# Patient Record
Sex: FEMALE | Race: ASIAN | NOT HISPANIC OR LATINO | Employment: STUDENT | URBAN - METROPOLITAN AREA
[De-identification: names, ages, dates, MRNs, and addresses within clinical notes are randomized per-mention and may not be internally consistent; named-entity substitution may affect disease eponyms.]

---

## 2022-05-17 ENCOUNTER — OFFICE VISIT (OUTPATIENT)
Dept: FAMILY MEDICINE CLINIC | Facility: CLINIC | Age: 32
End: 2022-05-17
Payer: COMMERCIAL

## 2022-05-17 VITALS
WEIGHT: 151.6 LBS | TEMPERATURE: 97.5 F | OXYGEN SATURATION: 99 % | SYSTOLIC BLOOD PRESSURE: 100 MMHG | DIASTOLIC BLOOD PRESSURE: 62 MMHG | BODY MASS INDEX: 25.26 KG/M2 | RESPIRATION RATE: 16 BRPM | HEART RATE: 90 BPM | HEIGHT: 65 IN

## 2022-05-17 DIAGNOSIS — E28.2 PCOD (POLYCYSTIC OVARIAN DISEASE): ICD-10-CM

## 2022-05-17 DIAGNOSIS — E03.9 ACQUIRED HYPOTHYROIDISM: ICD-10-CM

## 2022-05-17 DIAGNOSIS — Z00.00 PHYSICAL EXAM, ANNUAL: Primary | ICD-10-CM

## 2022-05-17 PROCEDURE — 99385 PREV VISIT NEW AGE 18-39: CPT | Performed by: FAMILY MEDICINE

## 2022-05-17 PROCEDURE — 1036F TOBACCO NON-USER: CPT | Performed by: FAMILY MEDICINE

## 2022-05-17 PROCEDURE — 3008F BODY MASS INDEX DOCD: CPT | Performed by: FAMILY MEDICINE

## 2022-05-17 PROCEDURE — 3725F SCREEN DEPRESSION PERFORMED: CPT | Performed by: FAMILY MEDICINE

## 2022-05-17 RX ORDER — DIPHENOXYLATE HYDROCHLORIDE AND ATROPINE SULFATE 2.5; .025 MG/1; MG/1
1 TABLET ORAL DAILY
COMMUNITY

## 2022-05-17 RX ORDER — PROGESTERONE 200 MG/1
CAPSULE ORAL
COMMUNITY
Start: 2022-05-10 | End: 2022-06-10

## 2022-05-17 RX ORDER — LEVOTHYROXINE SODIUM 0.07 MG/1
TABLET ORAL
COMMUNITY
Start: 2022-05-10 | End: 2022-05-24 | Stop reason: SDUPTHER

## 2022-05-17 NOTE — PROGRESS NOTES
Assessment/Plan:    No problem-specific Assessment & Plan notes found for this encounter  Diagnoses and all orders for this visit:    Physical exam, annual  Comments:  tdap through OB  continue f/u with OB    PCOD (polycystic ovarian disease)  Comments:  fasting insulin nml    Acquired hypothyroidism  Comments:  tsh at goal currently for 1st trimester  may follow with either me or OB    Other orders  -     levothyroxine 75 mcg tablet  -     Progesterone 200 MG CAPS; INSERT 1 CAPSULE(S) BY VAGINAL ROUTE TWICE DAILY WHEN DIRECTED  -     multivitamin (THERAGRAN) TABS; Take 1 tablet by mouth in the morning  Subjective:      Patient ID: Aimee Bolden is a 28 y o  female  HPI   Pt is 10 weeks pregnant and presents for establishment  She is doing well in general   Has hx of wheat and lactose intolerance, so she tries to limit that  Taking PNV  Had been seeing Dr Brit Viveros due to anovulation related to pcos  Is to establish with obgyn this week  Was started on synthroid by reproductive endo and last tsh 0 31 which is within range for 1st trimester  In general she feels well  Due for dental exam   Due for tdap during pregnancy, but she is otherwise up to date preventively  Moved from [place] 8 months ago  Pt has hx of reflux which has been somewhat worse over the last few days  Hasn't been daily  Cholesterol pre-conception showed trigs 180  Rest of panel was okay  Pt is walking for exercise currently  The following portions of the patient's history were reviewed and updated as appropriate: allergies, current medications, past family history, past medical history, past social history, past surgical history and problem list     Review of Systems   Constitutional: Negative for chills, fatigue, fever and unexpected weight change  HENT: Negative for congestion, ear pain, hearing loss, postnasal drip, rhinorrhea, sinus pressure, sinus pain, sore throat, trouble swallowing and voice change  Eyes: Negative for pain, redness and visual disturbance  Respiratory: Negative for cough and shortness of breath  Cardiovascular: Negative for chest pain, palpitations and leg swelling  Gastrointestinal: Negative for abdominal pain, constipation, diarrhea and nausea  Endocrine: Negative for cold intolerance, heat intolerance, polydipsia and polyuria  Genitourinary: Negative for dysuria, frequency and urgency  Musculoskeletal: Negative for arthralgias, joint swelling and myalgias  Skin: Negative for rash  No suspicious lesions   Neurological: Negative for weakness, numbness and headaches  Hematological: Negative for adenopathy  Objective:      /62   Pulse 90   Temp 97 5 °F (36 4 °C)   Resp 16   Ht 5' 4 5" (1 638 m)   Wt 68 8 kg (151 lb 9 6 oz)   LMP 03/03/2022 (Approximate)   SpO2 99%   BMI 25 62 kg/m²          Physical Exam  Constitutional:       General: She is not in acute distress  Appearance: She is well-developed  HENT:      Head: Normocephalic and atraumatic  Right Ear: Tympanic membrane, ear canal and external ear normal       Left Ear: Tympanic membrane, ear canal and external ear normal       Nose: Nose normal       Mouth/Throat:      Pharynx: No oropharyngeal exudate  Eyes:      Conjunctiva/sclera: Conjunctivae normal       Pupils: Pupils are equal, round, and reactive to light  Neck:      Thyroid: No thyromegaly  Vascular: No carotid bruit or JVD  Cardiovascular:      Rate and Rhythm: Regular rhythm  Heart sounds: S1 normal and S2 normal  No murmur heard  No friction rub  No gallop  No S3 or S4 sounds  Pulmonary:      Effort: Pulmonary effort is normal       Breath sounds: Normal breath sounds  No wheezing, rhonchi or rales  Abdominal:      General: Bowel sounds are normal  There is no distension  Palpations: Abdomen is soft  Tenderness: There is no abdominal tenderness     Lymphadenopathy:      Cervical: No cervical adenopathy  Neurological:      Mental Status: She is alert and oriented to person, place, and time  Cranial Nerves: No cranial nerve deficit  Sensory: No sensory deficit  Deep Tendon Reflexes: Reflexes are normal and symmetric  Depression Screening and Follow-up Plan: Patient was screened for depression during today's encounter  They screened negative with a PHQ-2 score of 0

## 2022-05-24 ENCOUNTER — APPOINTMENT (OUTPATIENT)
Dept: LAB | Facility: AMBULARY SURGERY CENTER | Age: 32
End: 2022-05-24
Attending: OBSTETRICS & GYNECOLOGY
Payer: COMMERCIAL

## 2022-05-24 ENCOUNTER — INITIAL PRENATAL (OUTPATIENT)
Dept: OBGYN CLINIC | Facility: CLINIC | Age: 32
End: 2022-05-24

## 2022-05-24 ENCOUNTER — TELEPHONE (OUTPATIENT)
Dept: OBGYN CLINIC | Facility: CLINIC | Age: 32
End: 2022-05-24

## 2022-05-24 VITALS — WEIGHT: 152.8 LBS | BODY MASS INDEX: 25.82 KG/M2

## 2022-05-24 DIAGNOSIS — E03.9 ACQUIRED HYPOTHYROIDISM: ICD-10-CM

## 2022-05-24 DIAGNOSIS — Z36.9 FIRST TRIMESTER SCREENING: ICD-10-CM

## 2022-05-24 DIAGNOSIS — Z34.01 ENCOUNTER FOR SUPERVISION OF NORMAL FIRST PREGNANCY IN FIRST TRIMESTER: ICD-10-CM

## 2022-05-24 DIAGNOSIS — Z34.01 ENCOUNTER FOR SUPERVISION OF NORMAL FIRST PREGNANCY IN FIRST TRIMESTER: Primary | ICD-10-CM

## 2022-05-24 LAB
ABO GROUP BLD: NORMAL
BASOPHILS # BLD AUTO: 0.03 THOUSANDS/ΜL (ref 0–0.1)
BASOPHILS NFR BLD AUTO: 0 % (ref 0–1)
BLD GP AB SCN SERPL QL: NEGATIVE
EOSINOPHIL # BLD AUTO: 0.1 THOUSAND/ΜL (ref 0–0.61)
EOSINOPHIL NFR BLD AUTO: 1 % (ref 0–6)
ERYTHROCYTE [DISTWIDTH] IN BLOOD BY AUTOMATED COUNT: 13.5 % (ref 11.6–15.1)
GLUCOSE 1H P 50 G GLC PO SERPL-MCNC: 129 MG/DL (ref 40–134)
HCT VFR BLD AUTO: 37 % (ref 34.8–46.1)
HGB BLD-MCNC: 12.1 G/DL (ref 11.5–15.4)
IMM GRANULOCYTES # BLD AUTO: 0.04 THOUSAND/UL (ref 0–0.2)
IMM GRANULOCYTES NFR BLD AUTO: 1 % (ref 0–2)
LYMPHOCYTES # BLD AUTO: 1.6 THOUSANDS/ΜL (ref 0.6–4.47)
LYMPHOCYTES NFR BLD AUTO: 20 % (ref 14–44)
MCH RBC QN AUTO: 29.9 PG (ref 26.8–34.3)
MCHC RBC AUTO-ENTMCNC: 32.7 G/DL (ref 31.4–37.4)
MCV RBC AUTO: 91 FL (ref 82–98)
MONOCYTES # BLD AUTO: 0.44 THOUSAND/ΜL (ref 0.17–1.22)
MONOCYTES NFR BLD AUTO: 6 % (ref 4–12)
NEUTROPHILS # BLD AUTO: 5.67 THOUSANDS/ΜL (ref 1.85–7.62)
NEUTS SEG NFR BLD AUTO: 72 % (ref 43–75)
NRBC BLD AUTO-RTO: 0 /100 WBCS
PLATELET # BLD AUTO: 179 THOUSANDS/UL (ref 149–390)
PMV BLD AUTO: 12.9 FL (ref 8.9–12.7)
RBC # BLD AUTO: 4.05 MILLION/UL (ref 3.81–5.12)
RH BLD: POSITIVE
RUBV IGG SERPL IA-ACNC: 11.5 IU/ML
SPECIMEN EXPIRATION DATE: NORMAL
WBC # BLD AUTO: 7.88 THOUSAND/UL (ref 4.31–10.16)

## 2022-05-24 PROCEDURE — 87086 URINE CULTURE/COLONY COUNT: CPT

## 2022-05-24 PROCEDURE — NOBC: Performed by: OBSTETRICS & GYNECOLOGY

## 2022-05-24 PROCEDURE — 82950 GLUCOSE TEST: CPT

## 2022-05-24 PROCEDURE — 86803 HEPATITIS C AB TEST: CPT

## 2022-05-24 PROCEDURE — 86787 VARICELLA-ZOSTER ANTIBODY: CPT

## 2022-05-24 PROCEDURE — 36415 COLL VENOUS BLD VENIPUNCTURE: CPT

## 2022-05-24 PROCEDURE — 80081 OBSTETRIC PANEL INC HIV TSTG: CPT

## 2022-05-24 RX ORDER — LEVOTHYROXINE SODIUM 0.07 MG/1
75 TABLET ORAL
Qty: 30 TABLET | Refills: 0 | Status: CANCELLED | OUTPATIENT
Start: 2022-05-24

## 2022-05-24 NOTE — PROGRESS NOTES
OB INTAKE INTERVIEW  Pt presents for OB intake  Had TSH done 5/15 0 31 per pt  Done at [facility] with timed intercourse after induced ovulation  Was a pt  Of [facility]  Plan:  - Prenatal labs ordered  - Referral given for MFM at confirmation scan  - Reviewed Genetic testing options   CF: discussed with pt  States she will discuss with    SMA: discussed with pt  States she will discuss with   - Patient to call for concerns  - RTO 3-4 weeks for OB F/U visit and PAP/Cultures    ~Last pap: -Summer in [facility]       OB History        1    Para        Term                AB        Living           SAB        IAB        Ectopic        Multiple        Live Births                     Hx of  delivery prior to 36 weeks 6 days:      Last Menstrual Period:                           Ultrasound date: 02, 7W 6D  Estimated Date of Delivery:  22     Current Issues:  Constipation : yes--getting better--discussed increased fiber, hydration, activity, stool softeners                Headaches :   no            Cramping:      occ           Spotting :        no             Interview education  · [facility] Pregnancy Essentials reviewed and discussed   · Baby and [address] Handout  · [facility] MFM Handouts  · Discussed genetic testing  · Prenatal lab work: Scripts printed and given to pt         Prior Pregnancy Delivery Complications              History of  delivery or PPROM: NO  History of Shoulder Dystocia: NO              History of vacuum or forceps delivery: NO              History of 3rd/4th degree laceration: NO              History of  section: NO     Diabetes              Pregestational DM: NO              hx of GDM: NO              BMI >35: NO              first degree relative with type 2 diabetes: NO              hx of PCOS: YES              current metformin use: NO              prior hx of LGA/macrosomia: NO               Hypertension              Hx of chronic HTN: NO              hx of gestational HTN: NO              hx of preeclampsia, eclampsia, or HELLP syndrome: NO              Age 28 or older: NO              Multifetal gestation:NO  Type 1 or Type 2 DM: NO  Renal Disease: NO  Autoimmune disease (systemic lupus erythematosus, antiphospholipid antibody syndrome): NO  Nulliparity:  YES  Obesity (BMI over 30): NO  More than 10 year pregnancy interval: NO  Previous IUGR, low birthweight or small for gestational age: NO     Immunizations:                influenza vaccine: yes              Covid Vaccination: yes, booster   Discussed Tdap vaccine administration at 27-28 weeks                   Immunization History   Administered Date(s) Administered    COVID-19 Pfizer vac (Pravin-sucrose, gray cap) 12 yr+ IM 01/12/2022            Dental visit with last 6 months: No  PHQ-2/9 score: 0  MyChart activated (not 1518 years of age)?: yes     The patient was oriented to our practice and all questions were answered

## 2022-05-25 ENCOUNTER — PATIENT MESSAGE (OUTPATIENT)
Dept: FAMILY MEDICINE CLINIC | Facility: CLINIC | Age: 32
End: 2022-05-25

## 2022-05-25 LAB
BACTERIA UR CULT: NORMAL
HBV SURFACE AG SER QL: NORMAL
HCV AB SER QL: NORMAL
HIV 1+2 AB+HIV1 P24 AG SERPL QL IA: NORMAL
RPR SER QL: NORMAL
VZV IGG SER IA-ACNC: NORMAL

## 2022-06-09 PROBLEM — O99.280 HYPOTHYROIDISM AFFECTING PREGNANCY: Status: ACTIVE | Noted: 2022-05-17

## 2022-06-09 PROBLEM — Z3A.13 13 WEEKS GESTATION OF PREGNANCY: Status: ACTIVE | Noted: 2022-06-09

## 2022-06-10 ENCOUNTER — ROUTINE PRENATAL (OUTPATIENT)
Dept: PERINATAL CARE | Facility: OTHER | Age: 32
End: 2022-06-10
Payer: COMMERCIAL

## 2022-06-10 VITALS
WEIGHT: 153 LBS | BODY MASS INDEX: 25.49 KG/M2 | HEART RATE: 89 BPM | HEIGHT: 65 IN | SYSTOLIC BLOOD PRESSURE: 105 MMHG | DIASTOLIC BLOOD PRESSURE: 64 MMHG

## 2022-06-10 DIAGNOSIS — Z36.82 ENCOUNTER FOR (NT) NUCHAL TRANSLUCENCY SCAN: ICD-10-CM

## 2022-06-10 DIAGNOSIS — Z34.01 ENCOUNTER FOR SUPERVISION OF NORMAL FIRST PREGNANCY IN FIRST TRIMESTER: ICD-10-CM

## 2022-06-10 DIAGNOSIS — Z36.9 FIRST TRIMESTER SCREENING: ICD-10-CM

## 2022-06-10 DIAGNOSIS — Z3A.13 13 WEEKS GESTATION OF PREGNANCY: Primary | ICD-10-CM

## 2022-06-10 PROBLEM — Z78.9 VEGETARIAN DIET: Status: ACTIVE | Noted: 2022-06-10

## 2022-06-10 PROBLEM — E03.8 SUBCLINICAL HYPOTHYROIDISM: Status: ACTIVE | Noted: 2022-05-17

## 2022-06-10 PROCEDURE — 76813 OB US NUCHAL MEAS 1 GEST: CPT | Performed by: OBSTETRICS & GYNECOLOGY

## 2022-06-10 PROCEDURE — 99242 OFF/OP CONSLTJ NEW/EST SF 20: CPT | Performed by: OBSTETRICS & GYNECOLOGY

## 2022-06-10 RX ORDER — ASPIRIN 81 MG/1
162 TABLET, CHEWABLE ORAL DAILY
COMMUNITY

## 2022-06-10 NOTE — PROGRESS NOTES
[address]: Ms Etelvina Hatch was seen today at 13w0d for nuchal translucency ultrasound  See ultrasound report under "OB Procedures" tab  My recommendations are as follows:  1  We reviewed the availability of genetic screening, as well as diagnostic testing, which are available to all pregnant women  We reviewed limitations, risks, and benefits of screening and testing  She does not wish to pursue aneuploidy screening or diagnostic testing at this time  MSAFP screening should be ordered through your office at 15-20 weeks gestation, and completed prior to fetal anatomic survey  A detailed anatomic survey as well as transvaginal cervical length screening are recommended between 18-22 weeks gestation  2  We reviewed her history of subclinical hypothyroidism which is characterized by elevated TSH, but normal Free T4 (FT4)  Her Thyroid Peroxidase (TPO) antibody status is unknown  She is currently taking 75mcg of levothyroxine, and reports her last TSH was 0 3 in early pregnancy  We reviewed that whether to treat with levothyroxine in pregnancy is controversial   Although subclinical hypothyroidism is associated with pregnancy risks including miscarriage and  birth, it is not clear that treatment with levothyroxine improves pregnancy outcomes  Importantly, levothyroxine treatment is not associated with adverse pregnancy risks  The decision to treat subclinical hypothyroidism in pregnancy should be individualized  If levothyroxine treatment is given, TSH levels should be monitored  Goal TSH levels in pregnancy recommended by the American Thyroid Association are 0 1-2 5 mIU/L (first trimester), 0 2-3 0 mIU/L (second trimester) and 0 3-3 0 mIU/L (third trimester)  Levothyroxine dose should be titrated to achieve these trimester-specific ranges  Thyroid function should be assessed at least each trimester using thyroid stimulating hormone (TSH) levels    After medication dose-adjustments, labs should be repeated every 4-6 weeks until euthyroid  3  The use of low dose aspirin in pregnancy (162mg) is recommended in women with a high risk, or multiple moderate risk factors for preeclampsia  Aspirin therapy should be initiated between 12-28 weeks gestation, and is most effective if started prior to 16 weeks gestation, and stopped by 36 weeks gestation  Low dose aspirin in pregnancy has been shown to reduce the incidence of preeclampsia in women with risk factors, and has been shown to be safe and without significant maternal or fetal risk  In light of her risk factors which include primigravida and hypothyroidism, I recommend initiating aspirin therapy       Please don't hesitate to contact our office with any concerns or questions     -Francesco Borrero MD

## 2022-06-10 NOTE — PATIENT INSTRUCTIONS
Please refer to " Ultrasound" under test results in [facility] for today's ultrasound findings  Congratulations, and best wishes for a healthy remainder of the pregnancy! The use of low dose aspirin in pregnancy (162mg) is recommended in women with a high risk, or multiple moderate risk factors for preeclampsia  Aspirin therapy should be initiated between 12-28 weeks gestation, and is most effective if started prior to 16 weeks gestation, and continued until 36 weeks gestation  Low dose aspirin in pregnancy has been shown to reduce the incidence of preeclampsia in women with risk factors, and has been shown to be safe and without significant maternal or fetal risk  In light of your risk factors for preeclampsia, including: Autoimmune Disease and Primiparity (first pregnancy) I recommend initiating aspirin therapy, which was prescribed today  Thank you for choosing [facility] Oleg Bishop for your visit today  We appreciate your trust and the opportunity to assist your obstetrician with your care  We value your feedback regarding the care we are providing  Following today's appointment, you may receive a patient satisfaction survey by mail or e-mail requesting feedback on your visit  We ask that you complete the survey to  help us understand how we are doing  Thank you for in advance for your feedback

## 2022-06-10 NOTE — LETTER
Christy 10, 2022     Jose Christy MD  [facility] [address]  [address]  [facility] [address]    Patient: Susan Muhammad   YOB: 1990   Date of Visit: 6/10/2022       Dear Dr Brenda Ceballos:    Thank you for referring Susan Muhammad to me for evaluation  Below are my notes for this consultation  If you have questions, please do not hesitate to call me  I look forward to following your patient along with you  Sincerely,        Ellie Jordan MD        CC: No Recipients  Ellie Jordan MD  6/10/2022  4:08 PM  Sign when Signing Visit  [address]: Ms Cora Beatty was seen today at 13w0d for nuchal translucency ultrasound  See ultrasound report under "OB Procedures" tab  My recommendations are as follows:  1  We reviewed the availability of genetic screening, as well as diagnostic testing, which are available to all pregnant women  We reviewed limitations, risks, and benefits of screening and testing  She does not wish to pursue aneuploidy screening or diagnostic testing at this time  MSAFP screening should be ordered through your office at 15-20 weeks gestation, and completed prior to fetal anatomic survey  A detailed anatomic survey as well as transvaginal cervical length screening are recommended between 18-22 weeks gestation  2  We reviewed her history of subclinical hypothyroidism which is characterized by elevated TSH, but normal Free T4 (FT4)  Her Thyroid Peroxidase (TPO) antibody status is unknown  She is currently taking 75mcg of levothyroxine, and reports her last TSH was 0 3 in early pregnancy  We reviewed that whether to treat with levothyroxine in pregnancy is controversial   Although subclinical hypothyroidism is associated with pregnancy risks including miscarriage and  birth, it is not clear that treatment with levothyroxine improves pregnancy outcomes  Importantly, levothyroxine treatment is not associated with adverse pregnancy risks  The decision to treat subclinical hypothyroidism in pregnancy should be individualized  If levothyroxine treatment is given, TSH levels should be monitored  Goal TSH levels in pregnancy recommended by the American Thyroid Association are 0 1-2 5 mIU/L (first trimester), 0 2-3 0 mIU/L (second trimester) and 0 3-3 0 mIU/L (third trimester)  Levothyroxine dose should be titrated to achieve these trimester-specific ranges  Thyroid function should be assessed at least each trimester using thyroid stimulating hormone (TSH) levels  After medication dose-adjustments, labs should be repeated every 4-6 weeks until euthyroid  3  The use of low dose aspirin in pregnancy (162mg) is recommended in women with a high risk, or multiple moderate risk factors for preeclampsia  Aspirin therapy should be initiated between 12-28 weeks gestation, and is most effective if started prior to 16 weeks gestation, and stopped by 36 weeks gestation  Low dose aspirin in pregnancy has been shown to reduce the incidence of preeclampsia in women with risk factors, and has been shown to be safe and without significant maternal or fetal risk  In light of her risk factors which include primigravida and hypothyroidism, I recommend initiating aspirin therapy       Please don't hesitate to contact our office with any concerns or questions     -Lul Donis MD

## 2022-06-13 ENCOUNTER — APPOINTMENT (OUTPATIENT)
Dept: LAB | Facility: HOSPITAL | Age: 32
End: 2022-06-13
Payer: COMMERCIAL

## 2022-06-13 DIAGNOSIS — E03.9 ACQUIRED HYPOTHYROIDISM: ICD-10-CM

## 2022-06-13 LAB — TSH SERPL DL<=0.05 MIU/L-ACNC: 0.32 UIU/ML (ref 0.45–4.5)

## 2022-06-13 PROCEDURE — 36415 COLL VENOUS BLD VENIPUNCTURE: CPT

## 2022-06-13 PROCEDURE — 84443 ASSAY THYROID STIM HORMONE: CPT

## 2022-06-14 ENCOUNTER — ROUTINE PRENATAL (OUTPATIENT)
Dept: OBGYN CLINIC | Facility: CLINIC | Age: 32
End: 2022-06-14

## 2022-06-14 VITALS
SYSTOLIC BLOOD PRESSURE: 92 MMHG | BODY MASS INDEX: 25.79 KG/M2 | HEIGHT: 65 IN | DIASTOLIC BLOOD PRESSURE: 60 MMHG | WEIGHT: 154.8 LBS

## 2022-06-14 DIAGNOSIS — O99.281: Primary | ICD-10-CM

## 2022-06-14 DIAGNOSIS — E07.9: Primary | ICD-10-CM

## 2022-06-14 DIAGNOSIS — E03.9 ACQUIRED HYPOTHYROIDISM: Primary | ICD-10-CM

## 2022-06-14 PROCEDURE — 3008F BODY MASS INDEX DOCD: CPT | Performed by: OBSTETRICS & GYNECOLOGY

## 2022-06-14 PROCEDURE — 87491 CHLMYD TRACH DNA AMP PROBE: CPT | Performed by: OBSTETRICS & GYNECOLOGY

## 2022-06-14 PROCEDURE — 87591 N.GONORRHOEAE DNA AMP PROB: CPT | Performed by: OBSTETRICS & GYNECOLOGY

## 2022-06-14 PROCEDURE — PNV: Performed by: OBSTETRICS & GYNECOLOGY

## 2022-06-14 NOTE — PROGRESS NOTES
28 y o   female at 13w4d (Estimated Date of Delivery: 22) for PNV  Pre- Vitals    Flowsheet Row Most Recent Value   Prenatal Assessment    Prenatal Vitals    Blood Pressure 92/60   Weight - Scale 70 2 kg (154 lb 12 8 oz)   Urine Albumin/Glucose    Dilation/Effacement/Station    Vaginal Drainage    Edema          kg (3 lb 3 8 oz)  Pt  Declines pap today, would like to wait until postpartum  Seen by MFM last week  Declines genetic screening  msAFP ordered today  Reviewed labs  Has some lower back/ SI joint pain  Reviewed conservative therapy  Consider PT if condition does not improve, or worsens

## 2022-06-15 LAB
C TRACH DNA SPEC QL NAA+PROBE: NEGATIVE
N GONORRHOEA DNA SPEC QL NAA+PROBE: NEGATIVE

## 2022-06-28 ENCOUNTER — APPOINTMENT (OUTPATIENT)
Dept: LAB | Facility: HOSPITAL | Age: 32
End: 2022-06-28
Payer: COMMERCIAL

## 2022-06-28 DIAGNOSIS — R39.9 UTI SYMPTOMS: Primary | ICD-10-CM

## 2022-06-28 DIAGNOSIS — R39.9 UTI SYMPTOMS: ICD-10-CM

## 2022-06-28 DIAGNOSIS — Z3A.15 15 WEEKS GESTATION OF PREGNANCY: ICD-10-CM

## 2022-06-28 LAB
BACTERIA UR QL AUTO: ABNORMAL /HPF
BILIRUB UR QL STRIP: NEGATIVE
CLARITY UR: CLEAR
COLOR UR: YELLOW
GLUCOSE UR STRIP-MCNC: NEGATIVE MG/DL
HGB UR QL STRIP.AUTO: NEGATIVE
KETONES UR STRIP-MCNC: NEGATIVE MG/DL
LEUKOCYTE ESTERASE UR QL STRIP: ABNORMAL
NITRITE UR QL STRIP: NEGATIVE
NON-SQ EPI CELLS URNS QL MICRO: ABNORMAL /HPF
PH UR STRIP.AUTO: 6 [PH]
PROT UR STRIP-MCNC: NEGATIVE MG/DL
RBC #/AREA URNS AUTO: ABNORMAL /HPF
SP GR UR STRIP.AUTO: 1.01 (ref 1–1.03)
UROBILINOGEN UR QL STRIP.AUTO: 0.2 E.U./DL
WBC #/AREA URNS AUTO: ABNORMAL /HPF

## 2022-06-28 PROCEDURE — 87086 URINE CULTURE/COLONY COUNT: CPT

## 2022-06-28 PROCEDURE — 81001 URINALYSIS AUTO W/SCOPE: CPT

## 2022-06-30 LAB — BACTERIA UR CULT: NORMAL

## 2022-07-07 ENCOUNTER — TELEPHONE (OUTPATIENT)
Dept: OBGYN CLINIC | Facility: CLINIC | Age: 32
End: 2022-07-07

## 2022-07-07 NOTE — TELEPHONE ENCOUNTER
Patient sent a [employer] message last night that around 945PM she coughed and leaked urine  She also had some belly pain when this occurred  Called patient this AM to speak with her on the phone  She states she was moving and had to cough  When she coughed she leaked some urine which was accompanied by mild belly pain  She states she smelt the fluid to confirm that it was urine  After that she was fine, she denies any continuation of pain or leakage  Discussed with Dr Joshua Franks  She is to monitor her symptoms if she has pain or any type of leakage again she is to call back   She is to keep her OB apt on Monday 7/11

## 2022-07-11 ENCOUNTER — ROUTINE PRENATAL (OUTPATIENT)
Dept: OBGYN CLINIC | Facility: CLINIC | Age: 32
End: 2022-07-11

## 2022-07-11 VITALS — DIASTOLIC BLOOD PRESSURE: 54 MMHG | WEIGHT: 158 LBS | SYSTOLIC BLOOD PRESSURE: 98 MMHG | BODY MASS INDEX: 26.7 KG/M2

## 2022-07-11 DIAGNOSIS — Z3A.17 17 WEEKS GESTATION OF PREGNANCY: ICD-10-CM

## 2022-07-11 DIAGNOSIS — O09.819 SUPERVISION OF PREGNANCY RESULTING FROM ASSISTED REPRODUCTIVE TECHNOLOGY: Primary | ICD-10-CM

## 2022-07-11 PROCEDURE — PNV: Performed by: OBSTETRICS & GYNECOLOGY

## 2022-07-11 NOTE — PROGRESS NOTES
This is a 33 y o   at 17w3d who presents for return OB visit  No complaints  Feeling flutters  BP: 98/54 TWlb    Reminded to do MSAFP  Scheduled for level 2 US  Patient recently found out she is relocating to [facility] for about 5 months and will be delivering there  Discussed process of transferring records     F/up 4 wks

## 2022-07-11 NOTE — PROGRESS NOTES
Patient states she is starting to feel fetal movement at nighttime  Patient states that she has the usual 1 month nervousness going on, otherwise she is doing good

## 2022-07-13 DIAGNOSIS — E03.9 ACQUIRED HYPOTHYROIDISM: Primary | ICD-10-CM

## 2022-07-15 ENCOUNTER — APPOINTMENT (OUTPATIENT)
Dept: LAB | Age: 32
End: 2022-07-15
Payer: COMMERCIAL

## 2022-07-15 DIAGNOSIS — E03.9 ACQUIRED HYPOTHYROIDISM: ICD-10-CM

## 2022-07-15 LAB
T3FREE SERPL-MCNC: 2.28 PG/ML (ref 2.3–4.2)
T4 FREE SERPL-MCNC: 1.19 NG/DL (ref 0.76–1.46)
TSH SERPL DL<=0.05 MIU/L-ACNC: 0.69 UIU/ML (ref 0.45–4.5)

## 2022-07-15 PROCEDURE — 84443 ASSAY THYROID STIM HORMONE: CPT

## 2022-07-15 PROCEDURE — 84481 FREE ASSAY (FT-3): CPT

## 2022-07-15 PROCEDURE — 36415 COLL VENOUS BLD VENIPUNCTURE: CPT

## 2022-07-15 PROCEDURE — 84439 ASSAY OF FREE THYROXINE: CPT

## 2022-07-15 PROCEDURE — 86376 MICROSOMAL ANTIBODY EACH: CPT

## 2022-07-17 LAB — THYROPEROXIDASE AB SERPL-ACNC: 8 IU/ML (ref 0–34)

## 2022-07-20 DIAGNOSIS — E03.9 ACQUIRED HYPOTHYROIDISM: Primary | ICD-10-CM

## 2022-07-22 ENCOUNTER — PATIENT MESSAGE (OUTPATIENT)
Dept: OBGYN CLINIC | Facility: CLINIC | Age: 32
End: 2022-07-22

## 2022-07-25 ENCOUNTER — TELEPHONE (OUTPATIENT)
Dept: PERINATAL CARE | Facility: OTHER | Age: 32
End: 2022-07-25

## 2022-07-25 NOTE — TELEPHONE ENCOUNTER
Called patient to reschedule follow up appointment cancelled in \[facility]\"" SERVICES:    Appointment canceled for Lianna Patel ([id number])   Visit Type: DETAILED ULTRASOUND PG   Date        Time      Length    Provider                  Department   8/2/2022     8:00 AM  60 mins  [facility]      Reason for Cancellation: Canceled via MyChart     Left voicemail request patient to call back to schedule at [phone]